# Patient Record
Sex: FEMALE | URBAN - METROPOLITAN AREA
[De-identification: names, ages, dates, MRNs, and addresses within clinical notes are randomized per-mention and may not be internally consistent; named-entity substitution may affect disease eponyms.]

---

## 2017-03-07 ENCOUNTER — OFFICE VISIT (OUTPATIENT)
Dept: OBGYN | Facility: CLINIC | Age: 27
End: 2017-03-07
Attending: OBSTETRICS & GYNECOLOGY
Payer: COMMERCIAL

## 2017-03-07 VITALS — SYSTOLIC BLOOD PRESSURE: 109 MMHG | DIASTOLIC BLOOD PRESSURE: 70 MMHG | HEART RATE: 73 BPM | WEIGHT: 117.8 LBS

## 2017-03-07 DIAGNOSIS — N63.20 LEFT BREAST MASS: ICD-10-CM

## 2017-03-07 DIAGNOSIS — Z12.4 SCREENING FOR MALIGNANT NEOPLASM OF CERVIX: ICD-10-CM

## 2017-03-07 DIAGNOSIS — Z11.3 SCREENING EXAMINATION FOR VENEREAL DISEASE: ICD-10-CM

## 2017-03-07 DIAGNOSIS — Z01.419 ENCOUNTER FOR GYNECOLOGICAL EXAMINATION WITHOUT ABNORMAL FINDING: ICD-10-CM

## 2017-03-07 DIAGNOSIS — Z00.00 VISIT FOR PREVENTIVE HEALTH EXAMINATION: Primary | ICD-10-CM

## 2017-03-07 DIAGNOSIS — Z30.011 ORAL CONTRACEPTION INITIAL PRESCRIPTION: ICD-10-CM

## 2017-03-07 DIAGNOSIS — N92.0 EXCESSIVE OR FREQUENT MENSTRUATION: ICD-10-CM

## 2017-03-07 LAB — TSH SERPL DL<=0.005 MIU/L-ACNC: 1.1 MU/L (ref 0.4–4)

## 2017-03-07 PROCEDURE — 36415 COLL VENOUS BLD VENIPUNCTURE: CPT | Performed by: OBSTETRICS & GYNECOLOGY

## 2017-03-07 PROCEDURE — 87491 CHLMYD TRACH DNA AMP PROBE: CPT | Performed by: OBSTETRICS & GYNECOLOGY

## 2017-03-07 PROCEDURE — 84443 ASSAY THYROID STIM HORMONE: CPT | Performed by: OBSTETRICS & GYNECOLOGY

## 2017-03-07 PROCEDURE — 87591 N.GONORRHOEAE DNA AMP PROB: CPT | Performed by: OBSTETRICS & GYNECOLOGY

## 2017-03-07 PROCEDURE — 99212 OFFICE O/P EST SF 10 MIN: CPT | Mod: 25

## 2017-03-07 PROCEDURE — G0145 SCR C/V CYTO,THINLAYER,RESCR: HCPCS | Performed by: OBSTETRICS & GYNECOLOGY

## 2017-03-07 RX ORDER — DROSPIRENONE AND ETHINYL ESTRADIOL 0.02-3(28)
1 KIT ORAL DAILY
Qty: 84 TABLET | Refills: 3 | Status: SHIPPED | OUTPATIENT
Start: 2017-03-07 | End: 2018-04-04

## 2017-03-07 ASSESSMENT — PATIENT HEALTH QUESTIONNAIRE - PHQ9: 5. POOR APPETITE OR OVEREATING: MORE THAN HALF THE DAYS

## 2017-03-07 ASSESSMENT — ANXIETY QUESTIONNAIRES
5. BEING SO RESTLESS THAT IT IS HARD TO SIT STILL: SEVERAL DAYS
GAD7 TOTAL SCORE: 11
7. FEELING AFRAID AS IF SOMETHING AWFUL MIGHT HAPPEN: NOT AT ALL
2. NOT BEING ABLE TO STOP OR CONTROL WORRYING: SEVERAL DAYS
3. WORRYING TOO MUCH ABOUT DIFFERENT THINGS: MORE THAN HALF THE DAYS
1. FEELING NERVOUS, ANXIOUS, OR ON EDGE: MORE THAN HALF THE DAYS
6. BECOMING EASILY ANNOYED OR IRRITABLE: NEARLY EVERY DAY

## 2017-03-07 NOTE — PATIENT INSTRUCTIONS

## 2017-03-07 NOTE — LETTER
3/7/2017     RE: Batool Thrasher  [address]  [place] [address]     Dear Colleague,    Thank you for referring your patient, Batool Thrasher, to the [facility] at [facility]. Please see a copy of my visit note below.    SUBJECTIVE   Batool Thrasher is a 27 year old G0, Patient's last menstrual period was 02/27/2017., here for an annual preventive exam.    Specific concerns today:  Annual exam, considering pregnancy  Wants COCs in the meantime - endorses mood changes just before menses    Gynecologic History  Patient's last menstrual period was 02/27/2017.   Menstrual History:  Menstrual History 3/7/2017   LAST MENSTRUAL PERIOD 2/27/2017   Period Cycle (Days) 28   Period Duration (Days) 3-4   Period Pattern Regular   Menstrual Flow Moderate   Reviewed Today Yes     Current contraception: condoms  Desires pregnancy within 12 months: probably  Number of partners in last year: 1    No results found for: PAP   History of abnormal Pap smear: No  HPV 3/3    Obstetric History  Obstetric History     No data available           Health Maintenance    There is no immunization history on file for this patient.  Health Maintenance   Topic Date Due     TETANUS IMMUNIZATION (SYSTEM ASSIGNED)  02/03/2007     PAP SCREENING Q3 YR (SYSTEM ASSIGNED)  02/03/2010     INFLUENZA VACCINE (SYSTEM ASSIGNED)  09/01/2017     No results found for: CHOL  No results found for: HDL  No results found for: LDL  No results found for: TSH    Colonoscopy n/a  Mammogram n/a    Past Medical History  Denies    Past Surgical History  Past Surgical History   Procedure Laterality Date     Us breast biopsy lt Left      Lumpectomy breast Left      benign     Medications  No current outpatient prescriptions on file.     No current facility-administered medications for this visit.        Allergies   No Known Allergies    Social History  Social History     Social History     Marital status: Single      Spouse name: N/A     Number of children: N/A     Years of education: N/A     Occupational History     Not on file.     Social History Main Topics     Smoking status: Never Smoker     Smokeless tobacco: Not on file     Alcohol use Not on file      Comment: occasional     Drug use: No     Sexual activity: Not on file     Other Topics Concern     Not on file     Social History Narrative     No narrative on file     Concerns about home situation/relationship: feels safe at home    Family History  No family history of breast, uterine, ovarian or colon cancer.    Review of Systems  Neg x 10 systems    OBJECTIVE   /70  Pulse 73  Wt 53.4 kg (117 lb 12.8 oz)  LMP 02/27/2017  General:  Alert, no distress   HEENT:  Normocephalic, without obvious abnormality  No thyromegaly   Breasts: Within normal limits bilaterally  L 1.5 cm round, mobile, well circumscribed mass in LUQ near axilla, near previous excisional scar   Lungs:  Clear to auscultation bilaterally   Heart:  Regular rate and rhythm, no murmur   Abdomen:  Soft, non-tender, non-distended, bowel sounds normal   Pelvic: -nefg  -vagina normal without discharge  -cervix normal in appearance, no masses  -uterus small, AV, mobile, NT  -no adnexal masses, NT  -anus, perineum wnl   Extremities:  normal       ASSESSMENT   Batool Thrasher is a 27 year old G0, annual preventive exam.    PLAN     Age 19-39 Annual Preventive Exam  1.  Screening   Ng/Ct testing today   HIV declined   Cervical cancer: pap with reflex today   CBE with L breast mass near excision site, u/s with dx imaging ordered, pt to schedule    2.  Immunizations   Tdap q 10 years UTD   Influenza yearly 7/16   MMR if not previously immunized UTD   Varicella if not previously immunized or history of disease UTD    3.  Preconception   [place], Hep B immune, reports h/o chickenpox   PNV    Discussed zika related travel    4.  Oral contraception, initial   Rx sent per request in the interim    5.  Concern for  clinical depression given PHQ-9, patient reports feels really cyclical.  Will do limited trial of COCs (drospirenone), encouraged psych f/u if no significant improvement.      RTC in one year for annual exam or with concerns.    Feli Moses MD MPH

## 2017-03-07 NOTE — MR AVS SNAPSHOT
After Visit Summary   3/7/2017    Batool Thrasher    MRN: [id number]           Patient Information     Date Of Birth          1990        Visit Information        Provider Department      3/7/2017 10:00 AM Feli Turcios MD [facility]        Today's Diagnoses     Oral contraception initial prescription    -  1    Excessive or frequent menstruation        Left breast mass           Follow-ups after your visit        Future tests that were ordered for you today     Open Future Orders        Priority Expected Expires Ordered    US Breast Left Limited 1-3 Quadrants Routine  3/7/2018 3/7/2017            Who to contact     Please call your clinic at [phone] to:    Ask questions about your health    Make or cancel appointments    Discuss your medicines    Learn about your test results    Speak to your doctor   If you have compliments or concerns about an experience at your clinic, or if you wish to file a complaint, please contact [facility] at [phone] or email us at [email or web address]         Additional Information About Your Visit        MyChart Information     [employer] is an electronic gateway that provides easy, online access to your medical records. With [employer], you can request a clinic appointment, read your test results, renew a prescription or communicate with your care team.     To sign up for [employer] visit the website at [email or web address]   You will be asked to enter the access code listed below, as well as some personal information. Please follow the directions to create your username and password.     Your access code is: [email or web address]  Expires: 2017 10:50 AM     Your access code will  in 90 days. If you need help or a new code, please contact your [facility] or call [phone] for assistance.        Care EveryWhere ID     This is your  Care EveryWhere ID. This could be used by other organizations to access your [facility] medical records  [id number]        Your Vitals Were     Pulse Last Period                73 02/27/2017           Blood Pressure from Last 3 Encounters:   03/07/17 109/70    Weight from Last 3 Encounters:   03/07/17 53.4 kg (117 lb 12.8 oz)              We Performed the Following     TSH with free T4 reflex          Today's Medication Changes          These changes are accurate as of: 3/7/17 10:50 AM.  If you have any questions, ask your nurse or doctor.               Start taking these medicines.        Dose/Directions    drospirenone-ethinyl estradiol 3-0.02 MG per tablet   Commonly known as:  PALMA   Used for:  Oral contraception initial prescription        Dose:  1 tablet   Take 1 tablet by mouth daily   Quantity:  84 tablet   Refills:  3            Where to get your medicines      These medications were sent to [facility]/pharmacy #[id number] - [place], [place] - [address] [place]  [address] , [facility] [address]     Phone:  [phone]     drospirenone-ethinyl estradiol 3-0.02 MG per tablet                Primary Care Provider    No Ref-Primary Verified       No address on file        Thank you!     Thank you for choosing [facility]  for your care. Our goal is always to provide you with excellent care. Hearing back from our patients is one way we can continue to improve our services. Please take a few minutes to complete the written survey that you may receive in the mail after your visit with us. Thank you!             Your Updated Medication List - Protect others around you: Learn how to safely use, store and throw away your medicines at www.disposemymeds.org.          This list is accurate as of: 3/7/17 10:50 AM.  Always use your most recent med list.                   Brand Name Dispense Instructions for use    drospirenone-ethinyl estradiol 3-0.02 MG per tablet    PALMA    84 tablet    Take  1 tablet by mouth daily

## 2017-03-07 NOTE — PROGRESS NOTES
SUBJECTIVE   Batool Thrasher is a 27 year old G0, Patient's last menstrual period was 02/27/2017., here for an annual preventive exam.    Specific concerns today:  Annual exam, considering pregnancy  Wants COCs in the meantime - endorses mood changes just before menses    Gynecologic History  Patient's last menstrual period was 02/27/2017.   Menstrual History:  Menstrual History 3/7/2017   LAST MENSTRUAL PERIOD 2/27/2017   Period Cycle (Days) 28   Period Duration (Days) 3-4   Period Pattern Regular   Menstrual Flow Moderate   Reviewed Today Yes     Current contraception: condoms  Desires pregnancy within 12 months: probably  Number of partners in last year: 1    No results found for: PAP   History of abnormal Pap smear: No  HPV 3/3    Obstetric History  Obstetric History     No data available           Health Maintenance    There is no immunization history on file for this patient.  Health Maintenance   Topic Date Due     TETANUS IMMUNIZATION (SYSTEM ASSIGNED)  02/03/2007     PAP SCREENING Q3 YR (SYSTEM ASSIGNED)  02/03/2010     INFLUENZA VACCINE (SYSTEM ASSIGNED)  09/01/2017     No results found for: CHOL  No results found for: HDL  No results found for: LDL  No results found for: TSH    Colonoscopy n/a  Mammogram n/a    Past Medical History  Denies    Past Surgical History  Past Surgical History   Procedure Laterality Date     Us breast biopsy lt Left      Lumpectomy breast Left      benign     Medications  No current outpatient prescriptions on file.     No current facility-administered medications for this visit.        Allergies   No Known Allergies    Social History  Social History     Social History     Marital status: Single     Spouse name: N/A     Number of children: N/A     Years of education: N/A     Occupational History     Not on file.     Social History Main Topics     Smoking status: Never Smoker     Smokeless tobacco: Not on file     Alcohol use Not on file      Comment: occasional     Drug use: No      Sexual activity: Not on file     Other Topics Concern     Not on file     Social History Narrative     No narrative on file     Concerns about home situation/relationship: feels safe at home    Family History  No family history of breast, uterine, ovarian or colon cancer.    Review of Systems  Neg x 10 systems    OBJECTIVE   /70  Pulse 73  Wt 53.4 kg (117 lb 12.8 oz)  LMP 02/27/2017  General:  Alert, no distress   HEENT:  Normocephalic, without obvious abnormality  No thyromegaly   Breasts: Within normal limits bilaterally  L 1.5 cm round, mobile, well circumscribed mass in LUQ near axilla, near previous excisional scar   Lungs:  Clear to auscultation bilaterally   Heart:  Regular rate and rhythm, no murmur   Abdomen:  Soft, non-tender, non-distended, bowel sounds normal   Pelvic: -nefg  -vagina normal without discharge  -cervix normal in appearance, no masses  -uterus small, AV, mobile, NT  -no adnexal masses, NT  -anus, perineum wnl   Extremities:  normal       ASSESSMENT   Batool Thrasher is a 27 year old G0, annual preventive exam.    PLAN     Age 19-39 Annual Preventive Exam  1.  Screening   Ng/Ct testing today   HIV declined   Cervical cancer: pap with reflex today   CBE with L breast mass near excision site, u/s with dx imaging ordered, pt to schedule    2.  Immunizations   Tdap q 10 years UTD   Influenza yearly 7/16   MMR if not previously immunized UTD   Varicella if not previously immunized or history of disease UTD    3.  Preconception   RI, Hep B immune, reports h/o chickenpox   PNV    Discussed zika related travel    4.  Oral contraception, initial   Rx sent per request in the interim    5.  Concern for clinical depression given PHQ-9, patient reports feels really cyclical.  Will do limited trial of COCs (drospirenone), encouraged psych f/u if no significant improvement.        RTC in one year for annual exam or with concerns.    Feli Moses MD MPH

## 2017-03-08 ASSESSMENT — PATIENT HEALTH QUESTIONNAIRE - PHQ9: SUM OF ALL RESPONSES TO PHQ QUESTIONS 1-9: 10

## 2017-03-08 ASSESSMENT — ANXIETY QUESTIONNAIRES: GAD7 TOTAL SCORE: 11

## 2017-03-09 LAB
C TRACH DNA SPEC QL NAA+PROBE: NORMAL
N GONORRHOEA DNA SPEC QL NAA+PROBE: NORMAL
SPECIMEN SOURCE: NORMAL
SPECIMEN SOURCE: NORMAL

## 2017-03-10 LAB
COPATH REPORT: NORMAL
PAP: NORMAL

## 2018-04-04 DIAGNOSIS — Z30.011 ORAL CONTRACEPTION INITIAL PRESCRIPTION: ICD-10-CM

## 2018-04-04 RX ORDER — DROSPIRENONE AND ETHINYL ESTRADIOL 0.02-3(28)
1 KIT ORAL DAILY
Qty: 84 TABLET | Refills: 3 | Status: SHIPPED | OUTPATIENT
Start: 2018-04-04

## 2018-04-04 NOTE — TELEPHONE ENCOUNTER
Received refill request for OCP.  Last in clinic 3/2017 where pap was normal. Refill sent per protocol.